# Patient Record
Sex: MALE | Race: WHITE
[De-identification: names, ages, dates, MRNs, and addresses within clinical notes are randomized per-mention and may not be internally consistent; named-entity substitution may affect disease eponyms.]

---

## 2019-12-05 ENCOUNTER — HOSPITAL ENCOUNTER (INPATIENT)
Dept: HOSPITAL 25 - ED | Age: 84
LOS: 13 days | Discharge: NURSING, REHAB OR LONG-TERM CARE | DRG: 884 | End: 2019-12-18
Attending: HOSPITALIST | Admitting: HOSPITALIST
Payer: MEDICARE

## 2019-12-05 DIAGNOSIS — I10: ICD-10-CM

## 2019-12-05 DIAGNOSIS — Z79.82: ICD-10-CM

## 2019-12-05 DIAGNOSIS — I35.0: ICD-10-CM

## 2019-12-05 DIAGNOSIS — Z95.0: ICD-10-CM

## 2019-12-05 DIAGNOSIS — I25.10: ICD-10-CM

## 2019-12-05 DIAGNOSIS — Z79.899: ICD-10-CM

## 2019-12-05 DIAGNOSIS — Z92.3: ICD-10-CM

## 2019-12-05 DIAGNOSIS — F03.91: Primary | ICD-10-CM

## 2019-12-05 DIAGNOSIS — Z85.46: ICD-10-CM

## 2019-12-05 DIAGNOSIS — Z85.118: ICD-10-CM

## 2019-12-05 DIAGNOSIS — F20.9: ICD-10-CM

## 2019-12-05 DIAGNOSIS — Z87.891: ICD-10-CM

## 2019-12-05 DIAGNOSIS — E78.5: ICD-10-CM

## 2019-12-05 DIAGNOSIS — Z86.73: ICD-10-CM

## 2019-12-05 LAB
ALBUMIN SERPL BCG-MCNC: 3.6 G/DL (ref 3.2–5.2)
ALBUMIN/GLOB SERPL: 0.9 {RATIO} (ref 1–3)
ALP SERPL-CCNC: 115 U/L (ref 34–104)
ALT SERPL W P-5'-P-CCNC: 23 U/L (ref 7–52)
ANION GAP SERPL CALC-SCNC: 6 MMOL/L (ref 2–11)
APAP SERPL-MCNC: < 15 MCG/ML
AST SERPL-CCNC: 27 U/L (ref 13–39)
BASOPHILS # BLD AUTO: 0 10^3/UL (ref 0–0.2)
BUN SERPL-MCNC: 20 MG/DL (ref 6–24)
BUN/CREAT SERPL: 21.1 (ref 8–20)
CALCIUM SERPL-MCNC: 9.3 MG/DL (ref 8.6–10.3)
CHLORIDE SERPL-SCNC: 106 MMOL/L (ref 101–111)
CK SERPL-CCNC: 530 U/L (ref 10–223)
EOSINOPHIL # BLD AUTO: 0.2 10^3/UL (ref 0–0.6)
GLOBULIN SER CALC-MCNC: 3.8 G/DL (ref 2–4)
GLUCOSE SERPL-MCNC: 109 MG/DL (ref 70–100)
HCO3 SERPL-SCNC: 28 MMOL/L (ref 22–32)
HCT VFR BLD AUTO: 33 % (ref 42–52)
HGB BLD-MCNC: 11 G/DL (ref 14–18)
LYMPHOCYTES # BLD AUTO: 1.6 10^3/UL (ref 1–4.8)
MAGNESIUM SERPL-MCNC: 2 MG/DL (ref 1.9–2.7)
MCH RBC QN AUTO: 27 PG (ref 27–31)
MCHC RBC AUTO-ENTMCNC: 33 G/DL (ref 31–36)
MCV RBC AUTO: 80 FL (ref 80–94)
MONOCYTES # BLD AUTO: 1.1 10^3/UL (ref 0–0.8)
NEUTROPHILS # BLD AUTO: 3.4 10^3/UL (ref 1.5–7.7)
NRBC # BLD AUTO: 0 10^3/UL
NRBC BLD QL AUTO: 0
PLATELET # BLD AUTO: 257 10^3/UL (ref 150–450)
POTASSIUM SERPL-SCNC: 4.2 MMOL/L (ref 3.5–5)
PROT SERPL-MCNC: 7.4 G/DL (ref 6.4–8.9)
RBC # BLD AUTO: 4.12 10^6 /UL (ref 4.18–5.48)
SODIUM SERPL-SCNC: 140 MMOL/L (ref 135–145)
TROPONIN I SERPL-MCNC: 0.01 NG/ML (ref ?–0.03)
TSH SERPL-ACNC: 2.62 MCIU/ML (ref 0.34–5.6)
WBC # BLD AUTO: 6.3 10^3/UL (ref 3.5–10.8)

## 2019-12-05 PROCEDURE — 70450 CT HEAD/BRAIN W/O DYE: CPT

## 2019-12-05 PROCEDURE — 82550 ASSAY OF CK (CPK): CPT

## 2019-12-05 PROCEDURE — 85018 HEMOGLOBIN: CPT

## 2019-12-05 PROCEDURE — 80307 DRUG TEST PRSMV CHEM ANLYZR: CPT

## 2019-12-05 PROCEDURE — 82565 ASSAY OF CREATININE: CPT

## 2019-12-05 PROCEDURE — 82140 ASSAY OF AMMONIA: CPT

## 2019-12-05 PROCEDURE — 80320 DRUG SCREEN QUANTALCOHOLS: CPT

## 2019-12-05 PROCEDURE — 85025 COMPLETE CBC W/AUTO DIFF WBC: CPT

## 2019-12-05 PROCEDURE — 81003 URINALYSIS AUTO W/O SCOPE: CPT

## 2019-12-05 PROCEDURE — 93005 ELECTROCARDIOGRAM TRACING: CPT

## 2019-12-05 PROCEDURE — 83605 ASSAY OF LACTIC ACID: CPT

## 2019-12-05 PROCEDURE — 84443 ASSAY THYROID STIM HORMONE: CPT

## 2019-12-05 PROCEDURE — G0480 DRUG TEST DEF 1-7 CLASSES: HCPCS

## 2019-12-05 PROCEDURE — 85049 AUTOMATED PLATELET COUNT: CPT

## 2019-12-05 PROCEDURE — 80053 COMPREHEN METABOLIC PANEL: CPT

## 2019-12-05 PROCEDURE — 80329 ANALGESICS NON-OPIOID 1 OR 2: CPT

## 2019-12-05 PROCEDURE — 99284 EMERGENCY DEPT VISIT MOD MDM: CPT

## 2019-12-05 PROCEDURE — 71045 X-RAY EXAM CHEST 1 VIEW: CPT

## 2019-12-05 PROCEDURE — 36415 COLL VENOUS BLD VENIPUNCTURE: CPT

## 2019-12-05 PROCEDURE — 84484 ASSAY OF TROPONIN QUANT: CPT

## 2019-12-05 PROCEDURE — 85014 HEMATOCRIT: CPT

## 2019-12-05 PROCEDURE — 83735 ASSAY OF MAGNESIUM: CPT

## 2019-12-05 RX ADMIN — ENOXAPARIN SODIUM SCH MG: 40 INJECTION SUBCUTANEOUS at 20:35

## 2019-12-05 RX ADMIN — DOCUSATE SODIUM SCH MG: 100 CAPSULE, LIQUID FILLED ORAL at 19:50

## 2019-12-05 RX ADMIN — QUETIAPINE FUMARATE SCH MG: 200 TABLET ORAL at 19:50

## 2019-12-05 RX ADMIN — AMLODIPINE BESYLATE SCH MG: 5 TABLET ORAL at 19:50

## 2019-12-05 RX ADMIN — HYDRALAZINE HYDROCHLORIDE PRN MG: 20 INJECTION INTRAMUSCULAR; INTRAVENOUS at 19:50
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
## 2019-12-06 RX ADMIN — MEMANTINE HYDROCHLORIDE SCH MG: 5 TABLET ORAL at 09:30

## 2019-12-06 RX ADMIN — QUETIAPINE FUMARATE SCH MG: 200 TABLET ORAL at 22:00

## 2019-12-06 RX ADMIN — AMLODIPINE BESYLATE SCH MG: 5 TABLET ORAL at 09:31

## 2019-12-06 RX ADMIN — Medication SCH UNITS: at 09:30

## 2019-12-06 RX ADMIN — ATORVASTATIN CALCIUM SCH MG: 40 TABLET, FILM COATED ORAL at 17:24

## 2019-12-06 RX ADMIN — ATENOLOL SCH MG: 50 TABLET ORAL at 09:31

## 2019-12-06 RX ADMIN — DOCUSATE SODIUM SCH MG: 100 CAPSULE, LIQUID FILLED ORAL at 09:31

## 2019-12-06 RX ADMIN — LISINOPRIL SCH MG: 10 TABLET ORAL at 09:30

## 2019-12-06 RX ADMIN — DOCUSATE SODIUM SCH MG: 100 CAPSULE, LIQUID FILLED ORAL at 22:00

## 2019-12-06 RX ADMIN — DONEPEZIL HYDROCHLORIDE SCH MG: 5 TABLET, FILM COATED ORAL at 09:31

## 2019-12-06 RX ADMIN — ENOXAPARIN SODIUM SCH MG: 40 INJECTION SUBCUTANEOUS at 22:01

## 2019-12-06 RX ADMIN — QUETIAPINE FUMARATE SCH MG: 200 TABLET ORAL at 09:32

## 2019-12-06 RX ADMIN — ASPIRIN SCH MG: 81 TABLET, COATED ORAL at 09:31

## 2019-12-06 RX ADMIN — HYDRALAZINE HYDROCHLORIDE PRN MG: 20 INJECTION INTRAMUSCULAR; INTRAVENOUS at 09:27

## 2019-12-07 RX ADMIN — ENOXAPARIN SODIUM SCH MG: 40 INJECTION SUBCUTANEOUS at 20:14

## 2019-12-07 RX ADMIN — DONEPEZIL HYDROCHLORIDE SCH MG: 5 TABLET, FILM COATED ORAL at 10:18

## 2019-12-07 RX ADMIN — ATORVASTATIN CALCIUM SCH MG: 40 TABLET, FILM COATED ORAL at 16:53

## 2019-12-07 RX ADMIN — LISINOPRIL SCH MG: 10 TABLET ORAL at 10:18

## 2019-12-07 RX ADMIN — QUETIAPINE FUMARATE SCH MG: 200 TABLET ORAL at 20:14

## 2019-12-07 RX ADMIN — MAGNESIUM HYDROXIDE SCH ML: 400 SUSPENSION ORAL at 20:14

## 2019-12-07 RX ADMIN — MEMANTINE HYDROCHLORIDE SCH MG: 5 TABLET ORAL at 11:23

## 2019-12-07 RX ADMIN — ASPIRIN SCH MG: 81 TABLET, COATED ORAL at 10:19

## 2019-12-07 RX ADMIN — Medication SCH UNITS: at 10:19

## 2019-12-07 RX ADMIN — DOCUSATE SODIUM SCH MG: 100 CAPSULE, LIQUID FILLED ORAL at 10:19

## 2019-12-07 RX ADMIN — QUETIAPINE FUMARATE SCH MG: 200 TABLET ORAL at 10:19

## 2019-12-07 RX ADMIN — DOCUSATE SODIUM SCH MG: 100 CAPSULE, LIQUID FILLED ORAL at 20:14

## 2019-12-07 RX ADMIN — AMLODIPINE BESYLATE SCH MG: 5 TABLET ORAL at 10:19

## 2019-12-07 RX ADMIN — ATENOLOL SCH MG: 50 TABLET ORAL at 10:19

## 2019-12-08 RX ADMIN — ENOXAPARIN SODIUM SCH MG: 40 INJECTION SUBCUTANEOUS at 20:50

## 2019-12-08 RX ADMIN — AMLODIPINE BESYLATE SCH MG: 5 TABLET ORAL at 08:42

## 2019-12-08 RX ADMIN — ATENOLOL SCH MG: 50 TABLET ORAL at 08:41

## 2019-12-08 RX ADMIN — Medication SCH UNITS: at 08:41

## 2019-12-08 RX ADMIN — QUETIAPINE FUMARATE SCH MG: 200 TABLET ORAL at 10:09

## 2019-12-08 RX ADMIN — ASPIRIN SCH MG: 81 TABLET, COATED ORAL at 08:41

## 2019-12-08 RX ADMIN — MAGNESIUM HYDROXIDE SCH ML: 400 SUSPENSION ORAL at 20:50

## 2019-12-08 RX ADMIN — HYDRALAZINE HYDROCHLORIDE PRN MG: 20 INJECTION INTRAMUSCULAR; INTRAVENOUS at 03:15

## 2019-12-08 RX ADMIN — MAGNESIUM HYDROXIDE SCH ML: 400 SUSPENSION ORAL at 08:41

## 2019-12-08 RX ADMIN — ATORVASTATIN CALCIUM SCH MG: 40 TABLET, FILM COATED ORAL at 17:14

## 2019-12-08 RX ADMIN — DOCUSATE SODIUM SCH MG: 100 CAPSULE, LIQUID FILLED ORAL at 20:49

## 2019-12-08 RX ADMIN — DONEPEZIL HYDROCHLORIDE SCH MG: 5 TABLET, FILM COATED ORAL at 08:42

## 2019-12-08 RX ADMIN — QUETIAPINE FUMARATE SCH MG: 200 TABLET ORAL at 20:49

## 2019-12-08 RX ADMIN — DOCUSATE SODIUM SCH MG: 100 CAPSULE, LIQUID FILLED ORAL at 08:41

## 2019-12-08 RX ADMIN — LISINOPRIL SCH MG: 10 TABLET ORAL at 08:41

## 2019-12-08 RX ADMIN — MEMANTINE HYDROCHLORIDE SCH MG: 5 TABLET ORAL at 10:09

## 2019-12-09 RX ADMIN — QUETIAPINE FUMARATE SCH MG: 200 TABLET ORAL at 08:17

## 2019-12-09 RX ADMIN — ENOXAPARIN SODIUM SCH MG: 40 INJECTION SUBCUTANEOUS at 20:43

## 2019-12-09 RX ADMIN — MEMANTINE HYDROCHLORIDE SCH MG: 5 TABLET ORAL at 08:17

## 2019-12-09 RX ADMIN — QUETIAPINE FUMARATE SCH MG: 200 TABLET ORAL at 20:43

## 2019-12-09 RX ADMIN — Medication SCH UNITS: at 08:17

## 2019-12-09 RX ADMIN — DOCUSATE SODIUM SCH MG: 100 CAPSULE, LIQUID FILLED ORAL at 20:43

## 2019-12-09 RX ADMIN — ATORVASTATIN CALCIUM SCH MG: 40 TABLET, FILM COATED ORAL at 17:02

## 2019-12-09 RX ADMIN — DOCUSATE SODIUM SCH MG: 100 CAPSULE, LIQUID FILLED ORAL at 08:17

## 2019-12-09 RX ADMIN — LISINOPRIL SCH MG: 10 TABLET ORAL at 08:17

## 2019-12-09 RX ADMIN — MAGNESIUM HYDROXIDE SCH ML: 400 SUSPENSION ORAL at 20:43

## 2019-12-09 RX ADMIN — ATENOLOL SCH MG: 50 TABLET ORAL at 08:17

## 2019-12-09 RX ADMIN — DONEPEZIL HYDROCHLORIDE SCH MG: 5 TABLET, FILM COATED ORAL at 08:17

## 2019-12-09 RX ADMIN — AMLODIPINE BESYLATE SCH MG: 5 TABLET ORAL at 08:16

## 2019-12-09 RX ADMIN — MAGNESIUM HYDROXIDE SCH ML: 400 SUSPENSION ORAL at 08:16

## 2019-12-09 RX ADMIN — ASPIRIN SCH MG: 81 TABLET, COATED ORAL at 08:17

## 2019-12-10 RX ADMIN — ATENOLOL SCH MG: 50 TABLET ORAL at 08:00

## 2019-12-10 RX ADMIN — ASPIRIN SCH MG: 81 TABLET, COATED ORAL at 08:01

## 2019-12-10 RX ADMIN — AMLODIPINE BESYLATE SCH MG: 5 TABLET ORAL at 08:02

## 2019-12-10 RX ADMIN — DONEPEZIL HYDROCHLORIDE SCH MG: 5 TABLET, FILM COATED ORAL at 08:01

## 2019-12-10 RX ADMIN — MAGNESIUM HYDROXIDE SCH ML: 400 SUSPENSION ORAL at 08:04

## 2019-12-10 RX ADMIN — DOCUSATE SODIUM SCH MG: 100 CAPSULE, LIQUID FILLED ORAL at 20:53

## 2019-12-10 RX ADMIN — MEMANTINE HYDROCHLORIDE SCH MG: 5 TABLET ORAL at 08:01

## 2019-12-10 RX ADMIN — ATORVASTATIN CALCIUM SCH MG: 40 TABLET, FILM COATED ORAL at 16:12

## 2019-12-10 RX ADMIN — DOCUSATE SODIUM SCH MG: 100 CAPSULE, LIQUID FILLED ORAL at 08:02

## 2019-12-10 RX ADMIN — Medication SCH UNITS: at 08:01

## 2019-12-10 RX ADMIN — LISINOPRIL SCH MG: 10 TABLET ORAL at 08:01

## 2019-12-10 RX ADMIN — QUETIAPINE FUMARATE SCH MG: 200 TABLET ORAL at 20:52

## 2019-12-10 RX ADMIN — MAGNESIUM HYDROXIDE SCH ML: 400 SUSPENSION ORAL at 20:52

## 2019-12-10 RX ADMIN — QUETIAPINE FUMARATE SCH MG: 200 TABLET ORAL at 08:03

## 2019-12-10 RX ADMIN — ENOXAPARIN SODIUM SCH MG: 40 INJECTION SUBCUTANEOUS at 20:53

## 2019-12-11 RX ADMIN — Medication SCH: at 10:11

## 2019-12-11 RX ADMIN — ATENOLOL SCH MG: 50 TABLET ORAL at 08:55

## 2019-12-11 RX ADMIN — ENOXAPARIN SODIUM SCH MG: 40 INJECTION SUBCUTANEOUS at 20:07

## 2019-12-11 RX ADMIN — MEMANTINE HYDROCHLORIDE SCH MG: 5 TABLET ORAL at 08:53

## 2019-12-11 RX ADMIN — ASPIRIN SCH MG: 81 TABLET, COATED ORAL at 08:55

## 2019-12-11 RX ADMIN — ATORVASTATIN CALCIUM SCH MG: 40 TABLET, FILM COATED ORAL at 17:53

## 2019-12-11 RX ADMIN — DOCUSATE SODIUM SCH MG: 100 CAPSULE, LIQUID FILLED ORAL at 20:06

## 2019-12-11 RX ADMIN — QUETIAPINE FUMARATE SCH MG: 200 TABLET ORAL at 08:55

## 2019-12-11 RX ADMIN — QUETIAPINE FUMARATE SCH MG: 200 TABLET ORAL at 20:06

## 2019-12-11 RX ADMIN — LISINOPRIL SCH MG: 10 TABLET ORAL at 08:52

## 2019-12-11 RX ADMIN — DONEPEZIL HYDROCHLORIDE SCH MG: 5 TABLET, FILM COATED ORAL at 08:52

## 2019-12-11 RX ADMIN — MAGNESIUM HYDROXIDE SCH ML: 400 SUSPENSION ORAL at 08:51

## 2019-12-11 RX ADMIN — DOCUSATE SODIUM SCH MG: 100 CAPSULE, LIQUID FILLED ORAL at 08:52

## 2019-12-11 RX ADMIN — AMLODIPINE BESYLATE SCH MG: 5 TABLET ORAL at 08:53

## 2019-12-12 RX ADMIN — QUETIAPINE FUMARATE SCH MG: 200 TABLET ORAL at 21:55

## 2019-12-12 RX ADMIN — ASPIRIN SCH MG: 81 TABLET, COATED ORAL at 08:39

## 2019-12-12 RX ADMIN — DOCUSATE SODIUM SCH MG: 100 CAPSULE, LIQUID FILLED ORAL at 21:55

## 2019-12-12 RX ADMIN — AMLODIPINE BESYLATE SCH MG: 5 TABLET ORAL at 08:37

## 2019-12-12 RX ADMIN — ENOXAPARIN SODIUM SCH MG: 40 INJECTION SUBCUTANEOUS at 21:56

## 2019-12-12 RX ADMIN — DONEPEZIL HYDROCHLORIDE SCH MG: 5 TABLET, FILM COATED ORAL at 08:38

## 2019-12-12 RX ADMIN — QUETIAPINE FUMARATE SCH MG: 200 TABLET ORAL at 08:36

## 2019-12-12 RX ADMIN — LISINOPRIL SCH MG: 10 TABLET ORAL at 08:37

## 2019-12-12 RX ADMIN — MEMANTINE HYDROCHLORIDE SCH MG: 5 TABLET ORAL at 08:38

## 2019-12-12 RX ADMIN — ATENOLOL SCH MG: 50 TABLET ORAL at 08:39

## 2019-12-12 RX ADMIN — ATORVASTATIN CALCIUM SCH MG: 40 TABLET, FILM COATED ORAL at 17:25

## 2019-12-12 RX ADMIN — Medication SCH UNITS: at 08:39

## 2019-12-12 RX ADMIN — DOCUSATE SODIUM SCH MG: 100 CAPSULE, LIQUID FILLED ORAL at 08:39

## 2019-12-13 RX ADMIN — AMLODIPINE BESYLATE SCH MG: 5 TABLET ORAL at 08:09

## 2019-12-13 RX ADMIN — DOCUSATE SODIUM SCH MG: 100 CAPSULE, LIQUID FILLED ORAL at 08:09

## 2019-12-13 RX ADMIN — ENOXAPARIN SODIUM SCH MG: 40 INJECTION SUBCUTANEOUS at 21:25

## 2019-12-13 RX ADMIN — ASPIRIN SCH MG: 81 TABLET, COATED ORAL at 08:09

## 2019-12-13 RX ADMIN — QUETIAPINE FUMARATE SCH MG: 200 TABLET ORAL at 21:24

## 2019-12-13 RX ADMIN — LISINOPRIL SCH MG: 10 TABLET ORAL at 08:09

## 2019-12-13 RX ADMIN — DONEPEZIL HYDROCHLORIDE SCH MG: 5 TABLET, FILM COATED ORAL at 08:09

## 2019-12-13 RX ADMIN — DOCUSATE SODIUM SCH MG: 100 CAPSULE, LIQUID FILLED ORAL at 21:24

## 2019-12-13 RX ADMIN — Medication SCH UNITS: at 08:09

## 2019-12-13 RX ADMIN — MEMANTINE HYDROCHLORIDE SCH MG: 5 TABLET ORAL at 08:09

## 2019-12-13 RX ADMIN — QUETIAPINE FUMARATE SCH MG: 200 TABLET ORAL at 08:08

## 2019-12-13 RX ADMIN — ATORVASTATIN CALCIUM SCH MG: 40 TABLET, FILM COATED ORAL at 16:38

## 2019-12-13 RX ADMIN — ATENOLOL SCH MG: 50 TABLET ORAL at 08:09
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
## 2019-12-14 LAB
HCT VFR BLD AUTO: 28 % (ref 42–52)
HGB BLD-MCNC: 9 G/DL (ref 14–18)
PLATELET # BLD AUTO: 238 10^3/UL (ref 150–450)

## 2019-12-14 RX ADMIN — QUETIAPINE FUMARATE SCH MG: 200 TABLET ORAL at 10:27

## 2019-12-14 RX ADMIN — DOCUSATE SODIUM SCH MG: 100 CAPSULE, LIQUID FILLED ORAL at 10:28

## 2019-12-14 RX ADMIN — LISINOPRIL SCH MG: 10 TABLET ORAL at 10:28

## 2019-12-14 RX ADMIN — MEMANTINE HYDROCHLORIDE SCH MG: 5 TABLET ORAL at 10:28

## 2019-12-14 RX ADMIN — AMLODIPINE BESYLATE SCH MG: 5 TABLET ORAL at 10:29

## 2019-12-14 RX ADMIN — ASPIRIN SCH MG: 81 TABLET, COATED ORAL at 10:28

## 2019-12-14 RX ADMIN — ENOXAPARIN SODIUM SCH MG: 40 INJECTION SUBCUTANEOUS at 22:56

## 2019-12-14 RX ADMIN — Medication SCH UNITS: at 10:26

## 2019-12-14 RX ADMIN — ATORVASTATIN CALCIUM SCH MG: 40 TABLET, FILM COATED ORAL at 16:44

## 2019-12-14 RX ADMIN — QUETIAPINE FUMARATE SCH MG: 200 TABLET ORAL at 22:55

## 2019-12-14 RX ADMIN — DOCUSATE SODIUM SCH MG: 100 CAPSULE, LIQUID FILLED ORAL at 22:55

## 2019-12-14 RX ADMIN — ATENOLOL SCH MG: 50 TABLET ORAL at 10:29

## 2019-12-14 RX ADMIN — DONEPEZIL HYDROCHLORIDE SCH MG: 5 TABLET, FILM COATED ORAL at 10:27

## 2019-12-15 RX ADMIN — DONEPEZIL HYDROCHLORIDE SCH MG: 5 TABLET, FILM COATED ORAL at 09:45

## 2019-12-15 RX ADMIN — LISINOPRIL SCH MG: 10 TABLET ORAL at 09:44

## 2019-12-15 RX ADMIN — QUETIAPINE FUMARATE SCH MG: 200 TABLET ORAL at 19:56

## 2019-12-15 RX ADMIN — QUETIAPINE FUMARATE SCH MG: 200 TABLET ORAL at 09:46

## 2019-12-15 RX ADMIN — ATENOLOL SCH MG: 50 TABLET ORAL at 09:45

## 2019-12-15 RX ADMIN — DOCUSATE SODIUM SCH MG: 100 CAPSULE, LIQUID FILLED ORAL at 09:45

## 2019-12-15 RX ADMIN — ATORVASTATIN CALCIUM SCH MG: 40 TABLET, FILM COATED ORAL at 17:08

## 2019-12-15 RX ADMIN — AMLODIPINE BESYLATE SCH MG: 5 TABLET ORAL at 09:44

## 2019-12-15 RX ADMIN — ENOXAPARIN SODIUM SCH MG: 40 INJECTION SUBCUTANEOUS at 19:56

## 2019-12-15 RX ADMIN — ASPIRIN SCH MG: 81 TABLET, COATED ORAL at 09:45

## 2019-12-15 RX ADMIN — Medication SCH UNITS: at 09:43

## 2019-12-15 RX ADMIN — MEMANTINE HYDROCHLORIDE SCH MG: 5 TABLET ORAL at 09:44

## 2019-12-15 RX ADMIN — DOCUSATE SODIUM SCH MG: 100 CAPSULE, LIQUID FILLED ORAL at 19:56

## 2019-12-16 RX ADMIN — ATENOLOL SCH MG: 50 TABLET ORAL at 09:40

## 2019-12-16 RX ADMIN — DOCUSATE SODIUM SCH MG: 100 CAPSULE, LIQUID FILLED ORAL at 09:41

## 2019-12-16 RX ADMIN — AMLODIPINE BESYLATE SCH MG: 5 TABLET ORAL at 09:42

## 2019-12-16 RX ADMIN — QUETIAPINE FUMARATE SCH MG: 200 TABLET ORAL at 20:09

## 2019-12-16 RX ADMIN — ASPIRIN SCH MG: 81 TABLET, COATED ORAL at 09:41

## 2019-12-16 RX ADMIN — ENOXAPARIN SODIUM SCH MG: 40 INJECTION SUBCUTANEOUS at 20:09

## 2019-12-16 RX ADMIN — DONEPEZIL HYDROCHLORIDE SCH MG: 5 TABLET, FILM COATED ORAL at 09:41

## 2019-12-16 RX ADMIN — MEMANTINE HYDROCHLORIDE SCH MG: 5 TABLET ORAL at 09:41

## 2019-12-16 RX ADMIN — QUETIAPINE FUMARATE SCH MG: 200 TABLET ORAL at 09:40

## 2019-12-16 RX ADMIN — DOCUSATE SODIUM SCH MG: 100 CAPSULE, LIQUID FILLED ORAL at 20:09

## 2019-12-16 RX ADMIN — Medication SCH UNITS: at 09:41

## 2019-12-16 RX ADMIN — LISINOPRIL SCH MG: 10 TABLET ORAL at 09:39

## 2019-12-16 RX ADMIN — ATORVASTATIN CALCIUM SCH MG: 40 TABLET, FILM COATED ORAL at 17:30

## 2019-12-17 RX ADMIN — MEMANTINE HYDROCHLORIDE SCH MG: 5 TABLET ORAL at 09:05

## 2019-12-17 RX ADMIN — ATENOLOL SCH MG: 50 TABLET ORAL at 09:04

## 2019-12-17 RX ADMIN — AMLODIPINE BESYLATE SCH MG: 5 TABLET ORAL at 09:05

## 2019-12-17 RX ADMIN — ASPIRIN SCH MG: 81 TABLET, COATED ORAL at 09:04

## 2019-12-17 RX ADMIN — ATORVASTATIN CALCIUM SCH MG: 40 TABLET, FILM COATED ORAL at 17:33

## 2019-12-17 RX ADMIN — QUETIAPINE FUMARATE SCH MG: 200 TABLET ORAL at 19:34

## 2019-12-17 RX ADMIN — Medication SCH UNITS: at 09:04

## 2019-12-17 RX ADMIN — DOCUSATE SODIUM SCH MG: 100 CAPSULE, LIQUID FILLED ORAL at 19:34

## 2019-12-17 RX ADMIN — ENOXAPARIN SODIUM SCH MG: 40 INJECTION SUBCUTANEOUS at 19:34

## 2019-12-17 RX ADMIN — DOCUSATE SODIUM SCH MG: 100 CAPSULE, LIQUID FILLED ORAL at 09:05

## 2019-12-17 RX ADMIN — DONEPEZIL HYDROCHLORIDE SCH MG: 5 TABLET, FILM COATED ORAL at 09:04

## 2019-12-17 RX ADMIN — LISINOPRIL SCH MG: 10 TABLET ORAL at 09:04

## 2019-12-17 RX ADMIN — QUETIAPINE FUMARATE SCH MG: 200 TABLET ORAL at 09:04
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
## 2019-12-18 VITALS — DIASTOLIC BLOOD PRESSURE: 72 MMHG | SYSTOLIC BLOOD PRESSURE: 175 MMHG

## 2019-12-18 RX ADMIN — ASPIRIN SCH MG: 81 TABLET, COATED ORAL at 09:09

## 2019-12-18 RX ADMIN — MEMANTINE HYDROCHLORIDE SCH MG: 5 TABLET ORAL at 09:13

## 2019-12-18 RX ADMIN — LISINOPRIL SCH MG: 10 TABLET ORAL at 09:10

## 2019-12-18 RX ADMIN — ATENOLOL SCH MG: 50 TABLET ORAL at 09:10

## 2019-12-18 RX ADMIN — AMLODIPINE BESYLATE SCH MG: 5 TABLET ORAL at 09:10

## 2019-12-18 RX ADMIN — Medication SCH UNITS: at 09:09

## 2019-12-18 RX ADMIN — DONEPEZIL HYDROCHLORIDE SCH MG: 5 TABLET, FILM COATED ORAL at 09:10

## 2019-12-18 RX ADMIN — QUETIAPINE FUMARATE SCH MG: 200 TABLET ORAL at 09:09

## 2019-12-18 RX ADMIN — DOCUSATE SODIUM SCH MG: 100 CAPSULE, LIQUID FILLED ORAL at 09:10
